# Patient Record
Sex: FEMALE | Race: WHITE | URBAN - METROPOLITAN AREA
[De-identification: names, ages, dates, MRNs, and addresses within clinical notes are randomized per-mention and may not be internally consistent; named-entity substitution may affect disease eponyms.]

---

## 2024-07-15 ENCOUNTER — APPOINTMENT (OUTPATIENT)
Dept: PRIMARY CARE | Facility: CLINIC | Age: 29
End: 2024-07-15

## 2024-07-31 NOTE — PATIENT INSTRUCTIONS
It was nice seeing you today.    Please go to [facility] lab or another [facility] lab facility to complete the lab testing that I ordered .  I have ordered xray of your lower back and hips; you can walk into [facility] radiology to complete.         Please call referral line to schedule to see: 1) sport medicine; 2) physical therapy; and 3) GYN appointment for pap smear and well woman visit       I recommend receiving the TDAP booster that prevents tetanus and other infectious disease.    I recommend the updated COVID vaccine that can be obtained at your local pharmacy    Please schedule a follow up with me in 4  weeks to discuss and review your test results and for PHYSICAL

## 2024-07-31 NOTE — PROGRESS NOTES
"Subjective   Dorota Mathis is a 29 y.o. female who presents for NPV TO ESTABLISH PCP CARE.    HPI:    29 y.o. female who presents for NPV TO ESTABLISH PCP CARE.       [facility] records reviewed. No records available for review.    PMHx:  scoliosis    Healthcare Providers:  none    Preventive Health Services:  -Last physical: 2019  -last pap: 2019  -last STI screening: ?  -Hep C screening: ?    Immunizations:   -Childhood vaccines: completed per patient    -updated COVID spike vaccine: NOW DUE  -TDAP:  2017==> NEXT DUE 2027      There is no immunization history on file for this patient.      Today Dorota reports:    - lower back and bilateral hip pain x 4 days, rated in severity as 5/10, not worsening over time. She reports that she thinks she over stretched doing yoga 4 days ago. She denies numbness/tingling, weakness, saddle anesthesia, or urinary or fecal incontinence or retention, or difficulty walking.     -otherwise doing and feeling well.     -not taking any medications other than OTC ibuprofen    Today she has no other reported complaints, issues, or problems.    ROS is NEG for HEADACHE, NAUSEA, VOMITING, DIARRHEA, CHEST PAIN, SOB, and BLEEDING/.  Review of systems (12) is negative for all systems except for any identified issues in HPI above.    Objective     /78   Pulse 78   Temp 36.7 °C (98 °F)   Ht 1.6 m (5' 3\")   Wt 59.9 kg (132 lb)   LMP 07/10/2024   SpO2 98%   BMI 23.38 kg/m²      Physical Examination:       GENERAL           General Appearance: well-appearing, well-developed, well-hydrated, well-nourished, no acute distress.        HEENT           NECK supple, no masses or thyromegaly, no carotid bruit.        EYES           Extraocular Movements: normal, bilateral eyes TOR, no conjunctival injection.        HEART           Rate and Rhythm regular rate and rhythm. Heart sounds: normal S1S2, no S3 or S4. Murmurs: none.        CHEST           Breath sounds: Clear to IPPA, RR<16 no use of " accessory muscles.        ABDOMEN           General: Neg for LKKS or masses, no scleral icterus or jaundice.        MUSCULOSKELETAL           Joints Demonstration: Neg for erythema, swelling or joint deformities. gross abnormalities no gross abnormalities. SPINE (cervical==> coccyx): non tender to palpation, full ROM. Tenderness to palpation of lumbar paraspinal muscles. Hips: non-tender to palpation, full ROM.       EXTREMITIES           Lower Extremities: Neg for cyanosis, clubbing or edema.       Assessment/Plan   Problem List Items Addressed This Visit    None  Visit Diagnoses       Encounter to establish care    -  Primary    Relevant Orders    CBC and Auto Differential    Comprehensive metabolic panel    Urinalysis with Reflex Microscopic    Tsh With Reflex To Free T4 If Abnormal    Encounter for medication review        Lipid screening        Relevant Orders    Lipid panel    Diabetes mellitus screening        Relevant Orders    Hemoglobin A1c    Vitamin D deficiency        Relevant Orders    Vitamin D 25-Hydroxy,Total (for eval of Vitamin D levels)    Encounter for hepatitis C screening test for low risk patient        Relevant Orders    Hepatitis C antibody    Routine screening for STI (sexually transmitted infection)        Relevant Orders    HIV 1/2 Antigen/Antibody Screen with Reflex to Confirmation    Syphilis Screen with Reflex    C. trachomatis / N. gonorrhoeae, DNA probe    Trichomonas vaginalis, Amplified    Cervical cancer screening        Immunization counseling        Relevant Orders    Referral to Gynecology          Establish PCP care  -labs ordered (see A/P above for details)    Lower back pain, most consistent with lower back strain: no red flag signs/sxs  -ibuprofen 600 mg every  8 hours with food x 5 days  -gentle stretching and apply heating pad every 6-8 hours to lower back muscles  -patient declined flexeril and medrol dose bhavna  -lumbar spine  and bilateral hip XR ordered  -referral to  PT and sports medicine ordered  -Emergency Room precautions discussed and reviewed with patient      Lipid Disorder screening  -lipid panel ordered    Diabetes Screening  -HgBA1c ordered    Vitamin D deficiency  -Vit D levels ordered     Hep C screening  -Hep C antibody ordered     STI Screening:  -HIV, syphilis, GC/CT, trich ordered    Cervical Cancer Screening; last pap smear ?  -referral to GYN for well woman and pap smear     Counseling:       Medication education:         Education:  The patient is counseled regarding potential side-effects of all new medications        Understanding:  Patient expressed understanding        Adherence:  No barriers to adherence identified        Immunizations Counseling  -TDAP now due==> declined  -recommend updated COVID spike vaccine that can be obtained at local pharmacy     FOLLOW-UP: 4 weeks to discuss and review test results and 8 weeks for PHYSICAL     Discussed recommended plan of care with patient. Patient expressed understanding and agreement with plan of care. All of patient's questions were answered at the time. Patient had no additional questions at the time.         Mandy Garcia MD, PhD

## 2024-08-01 ENCOUNTER — OFFICE VISIT (OUTPATIENT)
Dept: PRIMARY CARE | Facility: CLINIC | Age: 29
End: 2024-08-01
Payer: COMMERCIAL

## 2024-08-01 VITALS
OXYGEN SATURATION: 98 % | WEIGHT: 132 LBS | HEIGHT: 63 IN | SYSTOLIC BLOOD PRESSURE: 116 MMHG | TEMPERATURE: 98 F | BODY MASS INDEX: 23.39 KG/M2 | HEART RATE: 78 BPM | DIASTOLIC BLOOD PRESSURE: 78 MMHG

## 2024-08-01 DIAGNOSIS — M54.50 ACUTE BILATERAL LOW BACK PAIN WITHOUT SCIATICA: ICD-10-CM

## 2024-08-01 DIAGNOSIS — Z71.85 IMMUNIZATION COUNSELING: ICD-10-CM

## 2024-08-01 DIAGNOSIS — E55.9 VITAMIN D DEFICIENCY: ICD-10-CM

## 2024-08-01 DIAGNOSIS — T14.8XXA MUSCLE STRAIN: ICD-10-CM

## 2024-08-01 DIAGNOSIS — Z11.59 ENCOUNTER FOR HEPATITIS C SCREENING TEST FOR LOW RISK PATIENT: ICD-10-CM

## 2024-08-01 DIAGNOSIS — M25.551 BILATERAL HIP PAIN: ICD-10-CM

## 2024-08-01 DIAGNOSIS — Z79.899 ENCOUNTER FOR MEDICATION REVIEW: ICD-10-CM

## 2024-08-01 DIAGNOSIS — Z13.220 LIPID SCREENING: ICD-10-CM

## 2024-08-01 DIAGNOSIS — Z76.89 ENCOUNTER TO ESTABLISH CARE: Primary | ICD-10-CM

## 2024-08-01 DIAGNOSIS — Z13.1 DIABETES MELLITUS SCREENING: ICD-10-CM

## 2024-08-01 DIAGNOSIS — Z12.4 CERVICAL CANCER SCREENING: ICD-10-CM

## 2024-08-01 DIAGNOSIS — M25.552 BILATERAL HIP PAIN: ICD-10-CM

## 2024-08-01 DIAGNOSIS — Z11.3 ROUTINE SCREENING FOR STI (SEXUALLY TRANSMITTED INFECTION): ICD-10-CM

## 2024-08-01 PROCEDURE — 3008F BODY MASS INDEX DOCD: CPT | Performed by: FAMILY MEDICINE

## 2024-08-01 PROCEDURE — 1036F TOBACCO NON-USER: CPT | Performed by: FAMILY MEDICINE

## 2024-08-01 PROCEDURE — 99204 OFFICE O/P NEW MOD 45 MIN: CPT | Performed by: FAMILY MEDICINE

## 2024-08-01 ASSESSMENT — PAIN SCALES - GENERAL: PAINLEVEL: 0-NO PAIN

## 2024-08-01 ASSESSMENT — COLUMBIA-SUICIDE SEVERITY RATING SCALE - C-SSRS
6. HAVE YOU EVER DONE ANYTHING, STARTED TO DO ANYTHING, OR PREPARED TO DO ANYTHING TO END YOUR LIFE?: NO
2. HAVE YOU ACTUALLY HAD ANY THOUGHTS OF KILLING YOURSELF?: NO
1. IN THE PAST MONTH, HAVE YOU WISHED YOU WERE DEAD OR WISHED YOU COULD GO TO SLEEP AND NOT WAKE UP?: NO

## 2024-08-01 ASSESSMENT — PATIENT HEALTH QUESTIONNAIRE - PHQ9
2. FEELING DOWN, DEPRESSED OR HOPELESS: NOT AT ALL
1. LITTLE INTEREST OR PLEASURE IN DOING THINGS: NOT AT ALL
SUM OF ALL RESPONSES TO PHQ9 QUESTIONS 1 AND 2: 0

## 2024-09-05 ENCOUNTER — APPOINTMENT (OUTPATIENT)
Dept: PRIMARY CARE | Facility: CLINIC | Age: 29
End: 2024-09-05
Payer: COMMERCIAL

## 2024-09-12 ENCOUNTER — OFFICE VISIT (OUTPATIENT)
Dept: OBSTETRICS AND GYNECOLOGY | Facility: CLINIC | Age: 29
End: 2024-09-12
Payer: COMMERCIAL

## 2024-09-12 VITALS
HEIGHT: 62 IN | BODY MASS INDEX: 24.37 KG/M2 | WEIGHT: 132.4 LBS | DIASTOLIC BLOOD PRESSURE: 81 MMHG | SYSTOLIC BLOOD PRESSURE: 119 MMHG

## 2024-09-12 DIAGNOSIS — Z12.4 SCREENING FOR CERVICAL CANCER: ICD-10-CM

## 2024-09-12 DIAGNOSIS — Z01.419 WELL WOMAN EXAM: Primary | ICD-10-CM

## 2024-09-12 PROCEDURE — 99385 PREV VISIT NEW AGE 18-39: CPT | Performed by: ADVANCED PRACTICE MIDWIFE

## 2024-09-12 PROCEDURE — 3008F BODY MASS INDEX DOCD: CPT | Performed by: ADVANCED PRACTICE MIDWIFE

## 2024-09-12 PROCEDURE — 1036F TOBACCO NON-USER: CPT | Performed by: ADVANCED PRACTICE MIDWIFE

## 2024-09-12 ASSESSMENT — ENCOUNTER SYMPTOMS
LOSS OF SENSATION IN FEET: 0
OCCASIONAL FEELINGS OF UNSTEADINESS: 0
DEPRESSION: 0

## 2024-09-12 ASSESSMENT — PATIENT HEALTH QUESTIONNAIRE - PHQ9
SUM OF ALL RESPONSES TO PHQ9 QUESTIONS 1 AND 2: 0
1. LITTLE INTEREST OR PLEASURE IN DOING THINGS: NOT AT ALL
2. FEELING DOWN, DEPRESSED OR HOPELESS: NOT AT ALL

## 2024-09-12 ASSESSMENT — PAIN SCALES - GENERAL: PAINLEVEL: 0-NO PAIN

## 2024-09-12 NOTE — PROGRESS NOTES
"Assessment/Plan   Problem List Items Addressed This Visit       Well woman exam - Primary    Overview     History  Age of menarche: 11  Last pap: 2019 Normal pap sent 2024  History of abnormal: x 1 repeat normal  Mammogram: No  History of abnormal: No  History of STI: No  Contraception: No    Sexual Health  Active with: Male  Pain: No  Lubrication: No  Orgasm: No  Experienced sexual choking in past year: No    Family Cancer History  Breast: No  Ovarian: No  Endometrial: No  Colon: No    Colon cancer screening:   N/A - age  Calcium CT Scoring: N/A - age    Safety  Feels safe in home: Yes  Has been forced in sexual activity in last year: No                  Lara Iraheta, ALLEY-STEPHEN     Ruba Mathis is a 29 y.o. female who is here for a routine exam. Periods are regular every 35 days, lasting 5 days. Dysmenorrhea:none to mild cramping. Cyclic symptoms include none except for breast tenderness. No intermenstrual bleeding, spotting, or discharge.    ROS      /81   Ht 1.575 m (5' 2\")   Wt 60.1 kg (132 lb 6.4 oz)   LMP 08/23/2024 (Exact Date)   BMI 24.22 kg/m²     General:   Alert and oriented x 3   Heart:  Thyroid: Regular rate, rhythm  Euthyroid, normal shape and size   Lungs:  Breast: Clear to auscultation bilaterally  Symmetrical, no skin changes/nipple discharge, redness, tenderness, no masses palpated bilaterally   Abdomen: Soft, non tender   Vulva: EGBUS normal   Vagina: Pink, normal discharge   Cervix: No CMT.  Small discolored/freckle area at 11 o'clock on cervix   Uterus: Normal shape, size   Adnexa: NT bilaterally       Thank you for coming to see me for your visit today and most importantly thank you for having a preventative visit.  If lab work was sent, you will see your test result via [facility]  Any prescriptions will be sent electronically to your pharmacy listed    I look forward to seeing you next year for your health care needs.  Please remember:   Sleep is important!   Exercise " such as walking for 20 minutes per day is beneficial to your physical and mental health   Sleep is so important and should be a priority!   Healthy diets can be expensive -- if you every feel like you are struggling to afford healthy food, please let me know as we have a very good program called [employer] that is available to you   Decrease alcohol and tobacco    Be well!  Melanie

## 2024-09-19 ENCOUNTER — APPOINTMENT (OUTPATIENT)
Dept: PRIMARY CARE | Facility: CLINIC | Age: 29
End: 2024-09-19
Payer: COMMERCIAL

## 2024-09-24 LAB
CYTOLOGY CMNT CVX/VAG CYTO-IMP: NORMAL
LAB AP HPV GENOTYPE QUESTION: NO
LAB AP HPV HR: NORMAL
LABORATORY COMMENT REPORT: NORMAL
LMP START DATE: NORMAL
PATH REPORT.TOTAL CANCER: NORMAL

## 2024-10-04 ENCOUNTER — LAB (OUTPATIENT)
Dept: LAB | Facility: LAB | Age: 29
End: 2024-10-04
Payer: COMMERCIAL

## 2024-10-04 DIAGNOSIS — Z13.1 DIABETES MELLITUS SCREENING: ICD-10-CM

## 2024-10-04 DIAGNOSIS — Z76.89 ENCOUNTER TO ESTABLISH CARE: ICD-10-CM

## 2024-10-04 DIAGNOSIS — E55.9 VITAMIN D DEFICIENCY: ICD-10-CM

## 2024-10-04 DIAGNOSIS — Z13.220 LIPID SCREENING: ICD-10-CM

## 2024-10-04 DIAGNOSIS — Z11.3 ROUTINE SCREENING FOR STI (SEXUALLY TRANSMITTED INFECTION): ICD-10-CM

## 2024-10-04 DIAGNOSIS — D72.819 LEUKOPENIA, UNSPECIFIED TYPE: Primary | ICD-10-CM

## 2024-10-04 DIAGNOSIS — Z11.59 ENCOUNTER FOR HEPATITIS C SCREENING TEST FOR LOW RISK PATIENT: ICD-10-CM

## 2024-10-04 LAB
25(OH)D3 SERPL-MCNC: 46 NG/ML (ref 30–100)
ALBUMIN SERPL BCP-MCNC: 4.3 G/DL (ref 3.4–5)
ALP SERPL-CCNC: 47 U/L (ref 33–110)
ALT SERPL W P-5'-P-CCNC: 15 U/L (ref 7–45)
ANION GAP SERPL CALC-SCNC: 11 MMOL/L (ref 10–20)
APPEARANCE UR: CLEAR
AST SERPL W P-5'-P-CCNC: 18 U/L (ref 9–39)
BASOPHILS # BLD AUTO: 0.05 X10*3/UL (ref 0–0.1)
BASOPHILS NFR BLD AUTO: 1.3 %
BILIRUB SERPL-MCNC: 1.1 MG/DL (ref 0–1.2)
BILIRUB UR STRIP.AUTO-MCNC: NEGATIVE MG/DL
BUN SERPL-MCNC: 17 MG/DL (ref 6–23)
CALCIUM SERPL-MCNC: 9.5 MG/DL (ref 8.6–10.6)
CHLORIDE SERPL-SCNC: 102 MMOL/L (ref 98–107)
CHOLEST SERPL-MCNC: 156 MG/DL (ref 0–199)
CHOLESTEROL/HDL RATIO: 2.4
CO2 SERPL-SCNC: 31 MMOL/L (ref 21–32)
COLOR UR: NORMAL
CREAT SERPL-MCNC: 0.69 MG/DL (ref 0.5–1.05)
EGFRCR SERPLBLD CKD-EPI 2021: >90 ML/MIN/1.73M*2
EOSINOPHIL # BLD AUTO: 0.16 X10*3/UL (ref 0–0.7)
EOSINOPHIL NFR BLD AUTO: 4 %
ERYTHROCYTE [DISTWIDTH] IN BLOOD BY AUTOMATED COUNT: 12.1 % (ref 11.5–14.5)
EST. AVERAGE GLUCOSE BLD GHB EST-MCNC: 91 MG/DL
GLUCOSE SERPL-MCNC: 88 MG/DL (ref 74–99)
GLUCOSE UR STRIP.AUTO-MCNC: NORMAL MG/DL
HBA1C MFR BLD: 4.8 %
HCT VFR BLD AUTO: 41.3 % (ref 36–46)
HCV AB SER QL: NONREACTIVE
HDLC SERPL-MCNC: 64.7 MG/DL
HGB BLD-MCNC: 13.6 G/DL (ref 12–16)
HIV 1+2 AB+HIV1 P24 AG SERPL QL IA: NONREACTIVE
IMM GRANULOCYTES # BLD AUTO: 0.01 X10*3/UL (ref 0–0.7)
IMM GRANULOCYTES NFR BLD AUTO: 0.3 % (ref 0–0.9)
KETONES UR STRIP.AUTO-MCNC: NEGATIVE MG/DL
LDLC SERPL CALC-MCNC: 80 MG/DL
LEUKOCYTE ESTERASE UR QL STRIP.AUTO: NEGATIVE
LYMPHOCYTES # BLD AUTO: 1.73 X10*3/UL (ref 1.2–4.8)
LYMPHOCYTES NFR BLD AUTO: 43.7 %
MCH RBC QN AUTO: 29.8 PG (ref 26–34)
MCHC RBC AUTO-ENTMCNC: 32.9 G/DL (ref 32–36)
MCV RBC AUTO: 91 FL (ref 80–100)
MONOCYTES # BLD AUTO: 0.33 X10*3/UL (ref 0.1–1)
MONOCYTES NFR BLD AUTO: 8.3 %
NEUTROPHILS # BLD AUTO: 1.68 X10*3/UL (ref 1.2–7.7)
NEUTROPHILS NFR BLD AUTO: 42.4 %
NITRITE UR QL STRIP.AUTO: NEGATIVE
NON HDL CHOLESTEROL: 91 MG/DL (ref 0–149)
NRBC BLD-RTO: 0 /100 WBCS (ref 0–0)
PH UR STRIP.AUTO: 6.5 [PH]
PLATELET # BLD AUTO: 306 X10*3/UL (ref 150–450)
POTASSIUM SERPL-SCNC: 4.5 MMOL/L (ref 3.5–5.3)
PROT SERPL-MCNC: 6.7 G/DL (ref 6.4–8.2)
PROT UR STRIP.AUTO-MCNC: NEGATIVE MG/DL
RBC # BLD AUTO: 4.56 X10*6/UL (ref 4–5.2)
RBC # UR STRIP.AUTO: NEGATIVE /UL
SODIUM SERPL-SCNC: 139 MMOL/L (ref 136–145)
SP GR UR STRIP.AUTO: 1.01
TREPONEMA PALLIDUM IGG+IGM AB [PRESENCE] IN SERUM OR PLASMA BY IMMUNOASSAY: NONREACTIVE
TRIGL SERPL-MCNC: 57 MG/DL (ref 0–149)
TSH SERPL-ACNC: 2.14 MIU/L (ref 0.44–3.98)
UROBILINOGEN UR STRIP.AUTO-MCNC: NORMAL MG/DL
VLDL: 11 MG/DL (ref 0–40)
WBC # BLD AUTO: 4 X10*3/UL (ref 4.4–11.3)

## 2024-10-04 PROCEDURE — 86803 HEPATITIS C AB TEST: CPT

## 2024-10-04 PROCEDURE — 86780 TREPONEMA PALLIDUM: CPT

## 2024-10-04 PROCEDURE — 87389 HIV-1 AG W/HIV-1&-2 AB AG IA: CPT

## 2024-10-04 PROCEDURE — 87491 CHLMYD TRACH DNA AMP PROBE: CPT

## 2024-10-04 PROCEDURE — 83036 HEMOGLOBIN GLYCOSYLATED A1C: CPT

## 2024-10-04 PROCEDURE — 85025 COMPLETE CBC W/AUTO DIFF WBC: CPT

## 2024-10-04 PROCEDURE — 81003 URINALYSIS AUTO W/O SCOPE: CPT

## 2024-10-04 PROCEDURE — 80053 COMPREHEN METABOLIC PANEL: CPT

## 2024-10-04 PROCEDURE — 87661 TRICHOMONAS VAGINALIS AMPLIF: CPT

## 2024-10-04 PROCEDURE — 80061 LIPID PANEL: CPT

## 2024-10-04 PROCEDURE — 84443 ASSAY THYROID STIM HORMONE: CPT

## 2024-10-04 PROCEDURE — 82306 VITAMIN D 25 HYDROXY: CPT

## 2024-10-04 PROCEDURE — 36415 COLL VENOUS BLD VENIPUNCTURE: CPT

## 2024-10-04 PROCEDURE — 87591 N.GONORRHOEAE DNA AMP PROB: CPT

## 2024-10-04 ASSESSMENT — PROMIS GLOBAL HEALTH SCALE
RATE_MENTAL_HEALTH: EXCELLENT
RATE_SOCIAL_SATISFACTION: EXCELLENT
RATE_AVERAGE_PAIN: 0
CARRYOUT_SOCIAL_ACTIVITIES: EXCELLENT
EMOTIONAL_PROBLEMS: RARELY
CARRYOUT_PHYSICAL_ACTIVITIES: COMPLETELY
RATE_PHYSICAL_HEALTH: EXCELLENT
RATE_QUALITY_OF_LIFE: EXCELLENT
RATE_GENERAL_HEALTH: EXCELLENT

## 2024-10-05 LAB
C TRACH RRNA SPEC QL NAA+PROBE: NEGATIVE
N GONORRHOEA DNA SPEC QL PROBE+SIG AMP: NEGATIVE
T VAGINALIS RRNA SPEC QL NAA+PROBE: NEGATIVE

## 2024-10-07 NOTE — PATIENT INSTRUCTIONS
It was nice seeing you today.     Please go to [facility] lab or another [facility] lab facility to complete the repeat CBC lab testing that I ordered .     Please call radiology to schedule diagnostic left mammogram and left breast US.                                           Please call referral line to schedule to see: 1) hematology for evaluation low white blood cell counts.                 I recommend receiving the Flu and TDAP booster that prevents tetanus and other infectious disease.     I recommend the updated COVID vaccine that can be obtained at your local pharmacy     Please schedule a follow up with me in 12  weeks

## 2024-10-07 NOTE — PROGRESS NOTES
Subjective   Dorota Mathis is a 29 y.o. female who presents for FOLLOW UP TO DISCUSS and REVIEW TEST RESULTS and FOR ANNUAL PHYSICAL EXAM.    HPI:      29 y.o. female who presents for FOLLOW UP TO DISCUSS and REVIEW TEST RESULTS and FOR ANNUAL PHYSICAL EXAM.     [facility] records reviewed.      Last seen by me on 8/1/24 for NPV TO ESTABLISH PCP CARE.  At visit:    Establish PCP care  -labs ordered (see A/P above for details)     Lower back pain, most consistent with lower back strain: no red flag signs/sxs  -ibuprofen 600 mg every  8 hours with food x 5 days  -gentle stretching and apply heating pad every 6-8 hours to lower back muscles  -patient declined flexeril and medrol dose bhavna  -lumbar spine  and bilateral hip XR ordered  -referral to PT and sports medicine ordered  -Emergency Room precautions discussed and reviewed with patient        Lipid Disorder screening  -lipid panel ordered     Diabetes Screening  -HgBA1c ordered     Vitamin D deficiency  -Vit D levels ordered     Hep C screening  -Hep C antibody ordered     STI Screening:  -HIV, syphilis, GC/CT, trich ordered     Cervical Cancer Screening; last pap smear ?  -referral to GYN for well woman and pap smear     Immunizations Counseling  -TDAP now due==> declined  -recommend updated COVID spike vaccine that can be obtained at local pharmacy     FOLLOW-UP: 4 weeks to discuss and review test results and 8 weeks for PHYSICAL             PMHx:  scoliosis     Healthcare Providers:  none     Preventive Health Services:  -Last physical: 2019  -last pap: 9/12/24 Negative for intraepithelial lesion or malignancy.   -last STI screening: HIV, syphilis, GC/CT/trich negative 10/4/24  -Hep C screening:  negative 10/4/24     Immunizations:   -Childhood vaccines: completed per patient    -updated COVID spike vaccine: NOW DUE  -TDAP:  2017==> NEXT DUE 2027   -Flu: NOW DUE==> declined     There is no immunization history on file for this patient.    INTERVAL  "HISTORY:    -saw GYN 9/12/24 for well woman visit and pap smear    -lumbar spine and hip XR ordered 9/1/24 NOT YET COMPLETED    -completed labs ordered 8/1/24. Based on test results:    -low white blood cell count, otherwise normal blood counts: patient advised I have ordered a referral to hematology for further evaluation of low white blood count     -negative syphilis, Hep C, and HIV     -normal kidney and liver function     -good cholesterol control     -normal urinalysis     -thyroid function normal     -Vit D normal     -HgBA1c normal: no prediabetes    -gonorrhea, trichomonas, and chlamydia negative           Today Dorota reports:     - RESOLVED lower back and bilateral hip pain      -doing and feeling well.      -not taking any medications other than OTC ibuprofen    -has NOT YET scheduled appt with hematology for evaluation of leukopenia; states she will and schedule appt     Today she has no other reported complaints, issues, or problems.     ROS is NEG for HEADACHE, NAUSEA, VOMITING, DIARRHEA, CHEST PAIN, SOB, and BLEEDING/.  Review of systems (12) is negative for all systems except for any identified issues in HPI above.       Objective     /78   Pulse 66   Temp 36.5 °C (97.7 °F)   Ht 1.588 m (5' 2.5\")   Wt 60.2 kg (132 lb 12.8 oz)   LMP 09/29/2024 (Exact Date)   SpO2 99%   BMI 23.90 kg/m²      Physical Examination:       GENERAL           General Appearance: pleasant, well-appearing, well-developed, well-hydrated, well-nourished, well-groomed.        HEENT           NECK supple, Neg for adneopathy no thyroid enlargement or nodules, Oropharynx normal no exudates. Ears: TMs intact, normal no effusions.       EYES           Pupils: PERRLA, no photophobia.        HEART           Rate and Rhythm regular rate and rhythm. Heart sounds: normal S1S2. Murmurs: none.        LUNGS           Effort: Normal chest wall, no pectus, Normal air entry all fields, Clear to IPPA, RR<16 with no use of accessory " muscles.         BREASTS (patient declined chaperone)           Bilaterally: LEFT BREAST: palpable apricot pit sized mass in upper outer quadrant, non-tender to palpation, no nipple retraction, no nipple discharge, no abnormal skin changes. RIGHT BREAST: no masses, no nipple retraction, no nipple discharge, no abnormal skin changes. Axilla: no lymphadenopathy.        BACK           General: unremarkable, no spinal tenderness or rashes.        ABDOMEN           General: Normal to inspection, neg for LKKS or masses and BSs heard in all quadrants                  LYMPHATICS           Cervical: none. Axillary: none.        MUSCULOSKELETAL           gross abnormalities no gross abnormalities, no joint redness or swelling. SPINE (cervical==> coccyx): non tender to palpation, full ROM. Tenderness to palpation of lumbar paraspinal muscles. Hips: non-tender to palpation, full ROM.        EXTREMITIES           Varicose veins: not present. Pulses: 2+ bilateral. Clubbing: none. Cyanosis: no.        NEUROLOGICAL           Orientation: alert and oriented x 3. Grossly normal: yes. Plantars: downgoing bilaterally. Muscle Bulk: normal . Cranial Nerves: CN's II-XII grossly intact.        PSYCHOLOGY           Affect: appropriate. Mood: pleasant.       SKIN: no rashes. No lesions.          Assessment/Plan   Problem List Items Addressed This Visit    None  Visit Diagnoses       Annual physical exam    -  Primary    Leukopenia, unspecified type        Relevant Orders    CBC and Auto Differential    Acute bilateral low back pain without sciatica        Muscle strain        Immunization counseling              Annual Physical Exam==> COMPLETED Today 10/10/24==> NEXT DUE 10/10/25    Left breast mass: Denies Fhx f breast cancer. r/o neoplasm  -Left diagnostic mammogram and left breast US ordered    Leukopenia:   -repeat CBC ordered  -referral to hematology previously ordered for further evaluation; patient advised to call and schedule  appt    History Lower back pain, most consistent with lower back strain==> RESOLVED        Counseling:       Medication education:         Education:  The patient is counseled regarding potential side-effects of all new medications        Understanding:  Patient expressed understanding        Adherence:  No barriers to adherence identified        Immunizations Counseling  -TDAP and flu now due==> declined  -recommend updated COVID spike vaccine that can be obtained at local pharmacy     FOLLOW-UP: 12 weeks     I have personally reviewed all available pertinent labs, imaging, and consult notes with the patient.      Discussed recommended plan of care with patient. Patient expressed understanding and agreement with plan of care. All of patient's questions were answered at the time. Patient had no additional questions at the time.         Mandy Garcia M.D.

## 2024-10-10 ENCOUNTER — OFFICE VISIT (OUTPATIENT)
Dept: PRIMARY CARE | Facility: CLINIC | Age: 29
End: 2024-10-10
Payer: COMMERCIAL

## 2024-10-10 VITALS
OXYGEN SATURATION: 99 % | WEIGHT: 132.8 LBS | TEMPERATURE: 97.7 F | BODY MASS INDEX: 23.53 KG/M2 | HEART RATE: 66 BPM | HEIGHT: 63 IN | SYSTOLIC BLOOD PRESSURE: 110 MMHG | DIASTOLIC BLOOD PRESSURE: 78 MMHG

## 2024-10-10 DIAGNOSIS — M54.50 ACUTE BILATERAL LOW BACK PAIN WITHOUT SCIATICA: ICD-10-CM

## 2024-10-10 DIAGNOSIS — D72.819 LEUKOPENIA, UNSPECIFIED TYPE: ICD-10-CM

## 2024-10-10 DIAGNOSIS — T14.8XXA MUSCLE STRAIN: ICD-10-CM

## 2024-10-10 DIAGNOSIS — N63.21 MASS OF UPPER OUTER QUADRANT OF LEFT BREAST: ICD-10-CM

## 2024-10-10 DIAGNOSIS — Z71.85 IMMUNIZATION COUNSELING: ICD-10-CM

## 2024-10-10 DIAGNOSIS — Z00.00 ANNUAL PHYSICAL EXAM: Primary | ICD-10-CM

## 2024-10-10 PROCEDURE — 1036F TOBACCO NON-USER: CPT | Performed by: FAMILY MEDICINE

## 2024-10-10 PROCEDURE — 3008F BODY MASS INDEX DOCD: CPT | Performed by: FAMILY MEDICINE

## 2024-10-10 PROCEDURE — 99395 PREV VISIT EST AGE 18-39: CPT | Performed by: FAMILY MEDICINE

## 2024-10-10 ASSESSMENT — COLUMBIA-SUICIDE SEVERITY RATING SCALE - C-SSRS
2. HAVE YOU ACTUALLY HAD ANY THOUGHTS OF KILLING YOURSELF?: NO
6. HAVE YOU EVER DONE ANYTHING, STARTED TO DO ANYTHING, OR PREPARED TO DO ANYTHING TO END YOUR LIFE?: NO
1. IN THE PAST MONTH, HAVE YOU WISHED YOU WERE DEAD OR WISHED YOU COULD GO TO SLEEP AND NOT WAKE UP?: NO

## 2024-10-10 ASSESSMENT — PATIENT HEALTH QUESTIONNAIRE - PHQ9
2. FEELING DOWN, DEPRESSED OR HOPELESS: NOT AT ALL
SUM OF ALL RESPONSES TO PHQ9 QUESTIONS 1 AND 2: 0
1. LITTLE INTEREST OR PLEASURE IN DOING THINGS: NOT AT ALL

## 2024-10-10 ASSESSMENT — PAIN SCALES - GENERAL: PAINLEVEL: 0-NO PAIN

## 2024-10-31 ENCOUNTER — APPOINTMENT (OUTPATIENT)
Dept: RADIOLOGY | Facility: HOSPITAL | Age: 29
End: 2024-10-31
Payer: COMMERCIAL

## 2024-10-31 ENCOUNTER — HOSPITAL ENCOUNTER (OUTPATIENT)
Dept: RADIOLOGY | Facility: HOSPITAL | Age: 29
Discharge: HOME | End: 2024-10-31
Payer: COMMERCIAL

## 2024-10-31 DIAGNOSIS — N63.21 MASS OF UPPER OUTER QUADRANT OF LEFT BREAST: ICD-10-CM

## 2024-10-31 PROCEDURE — 76642 ULTRASOUND BREAST LIMITED: CPT | Mod: LEFT SIDE | Performed by: RADIOLOGY

## 2024-10-31 PROCEDURE — 76642 ULTRASOUND BREAST LIMITED: CPT | Mod: LT

## 2024-12-18 ENCOUNTER — APPOINTMENT (OUTPATIENT)
Dept: HEMATOLOGY/ONCOLOGY | Facility: CLINIC | Age: 29
End: 2024-12-18
Payer: COMMERCIAL

## 2025-01-04 NOTE — PROGRESS NOTES
Subjective   Dorota Mathis is a 29 y.o. female who presents for FOLLOW UP VISIT to DISCUSS and REVIEW TEST RESULTS.    HPI:      29 y.o. female who presents for FOLLOW UP VISIT to DISCUSS and REVIEW TEST RESULTS..     [facility] records reviewed.      Last seen by me on 10/10/24 for FOLLOW UP TO DISCUSS and REVIEW TEST RESULTS and FOR ANNUAL PHYSICAL EXAM.  At visit:    Annual Physical Exam==> COMPLETED Today 10/10/24==> NEXT DUE 10/10/25     Left breast mass: Denies Fhx of breast cancer. r/o neoplasm  -Left diagnostic mammogram and left breast US ordered     Leukopenia:   -repeat CBC ordered  -referral to hematology previously ordered for further evaluation; patient advised to call and schedule appt     History Lower back pain, most consistent with lower back strain==> RESOLVED         Immunizations Counseling  -TDAP and flu now due==> declined  -recommend updated COVID spike vaccine that can be obtained at local pharmacy     FOLLOW-UP: 12 weeks              Also seen by me on 8/1/24 for NPV TO ESTABLISH PCP CARE.  At visit:     Establish PCP care  -labs ordered (see A/P above for details)     Lower back pain, most consistent with lower back strain: no red flag signs/sxs  -ibuprofen 600 mg every  8 hours with food x 5 days  -gentle stretching and apply heating pad every 6-8 hours to lower back muscles  -patient declined flexeril and medrol dose bhavna  -lumbar spine  and bilateral hip XR ordered  -referral to PT and sports medicine ordered  -Emergency Room precautions discussed and reviewed with patient        Lipid Disorder screening  -lipid panel ordered     Diabetes Screening  -HgBA1c ordered     Vitamin D deficiency  -Vit D levels ordered     Hep C screening  -Hep C antibody ordered     STI Screening:  -HIV, syphilis, GC/CT, trich ordered     Cervical Cancer Screening; last pap smear ?  -referral to GYN for well woman and pap smear     Immunizations Counseling  -TDAP now due==> declined  -recommend updated COVID  spike vaccine that can be obtained at local pharmacy     FOLLOW-UP: 4 weeks to discuss and review test results and 8 weeks for PHYSICAL              PMHx:  scoliosis     Healthcare Providers:  none     Preventive Health Services:  -Last physical: 10/10/24  -last pap: 9/12/24 Negative for intraepithelial lesion or malignancy.   -last STI screening: HIV, syphilis, GC/CT/trich negative 10/4/24  -Hep C screening:  negative 10/4/24     Immunizations:   -Childhood vaccines: completed per patient    -updated COVID spike vaccine: NOW DUE  -TDAP:  2017==> NEXT DUE 2027   -Flu: NOW DUE==> declined       There is no immunization history on file for this patient.       INTERVAL HISTORY:        -repeat CBC  ordered 10/10/24 NOT YET COMPLETED    -lumbar spine and hip XR ordered 9/1/24 NOT YET COMPLETED==> RESOLVED lumbar and hip pain     Left breast US (10/31/24):  Interpreted By: Delia Genao,   IMPRESSION:  Dense fibroglandular tissue at the site of clinical concern with no  targeted sonographic evidence of malignancy or solid mass or cyst at  this site.      BI-RADS CATEGORY:  BI-RADS Category:  1 Negative.  Recommendation:  Follow-up as recommended or needed.      Today Dorota reports:     -doing and feeling well.     -RESOLVED lower back and bilateral hip pain      -not taking any medications other than OTC ibuprofen as needed     -has NOT YET scheduled appt with hematology for evaluation of leukopenia; states she will and schedule appt since she cancelled previously scheduled appt     Today she has no other reported complaints, issues, or problems.     ROS is NEG for HEADACHE, NAUSEA, VOMITING, DIARRHEA, CHEST PAIN, SOB, and BLEEDING.  Review of systems (12) is negative for all systems except for any identified issues in HPI above.         Objective     /84   Pulse 85   Temp 36.6 °C (97.8 °F)   LMP 12/11/2024   SpO2 97%      Physical Examination:       GENERAL           General Appearance: well-appearing,  "well-developed, well-hydrated, well-nourished, no acute distress.        HEENT           NECK supple, no masses or thyromegaly, no carotid bruit.        EYES           Extraocular Movements: normal, bilateral eyes TOR, no conjunctival injection.        HEART           Rate and Rhythm regular rate and rhythm. Heart sounds: normal S1S2, no S3 or S4. Murmurs: none.        CHEST           Breath sounds: Clear to IPPA, RR<16 no use of accessory muscles.        ABDOMEN           General: Neg for LKKS or masses, no scleral icterus or jaundice.        MUSCULOSKELETAL           Joints Demonstration: Neg for erythema, swelling or joint deformities. gross abnormalities no gross abnormalities.        EXTREMITIES           Lower Extremities: Neg for cyanosis, clubbing or edema.       Assessment/Plan   Problem List Items Addressed This Visit    None  Visit Diagnoses       Leukopenia, unspecified type    -  Primary    Mass of upper outer quadrant of left breast        Immunization counseling        Acute bilateral low back pain without sciatica                Leukopenia:   -repeat CBC ordered 10/10/24  -referral to hematology previously ordered for further evaluation; patient advised to call and schedule appt     History Lower back pain, most consistent with lower back strain==> RESOLVED     History of palpable  left breast mass/density: patient denies Fhx breast cancer.  Completed left breast US 10/31/24 that per radiology showed \"Dense fibroglandular tissue at the site of clinical concern with no targeted sonographic evidence of malignancy or solid mass or cyst at this site.  -will continue to monitor     Counseling:       Medication education:         Education:  The patient is counseled regarding potential side-effects of all new medications        Understanding:  Patient expressed understanding        Adherence:  No barriers to adherence identified        Immunizations Counseling  -flu now due==> declined  -recommend " updated COVID spike vaccine that can be obtained at local pharmacy     FOLLOW-UP: 12 weeks      I have personally reviewed all available pertinent labs, imaging, and consult notes with the patient.      Discussed recommended plan of care with patient. Patient expressed understanding and agreement with plan of care. All of patient's questions were answered at the time. Patient had no additional questions at the time.         Mandy Garcia MD, PhD

## 2025-01-07 ENCOUNTER — OFFICE VISIT (OUTPATIENT)
Dept: PRIMARY CARE | Facility: CLINIC | Age: 30
End: 2025-01-07
Payer: COMMERCIAL

## 2025-01-07 VITALS
TEMPERATURE: 97.8 F | HEART RATE: 85 BPM | DIASTOLIC BLOOD PRESSURE: 84 MMHG | OXYGEN SATURATION: 97 % | SYSTOLIC BLOOD PRESSURE: 114 MMHG

## 2025-01-07 DIAGNOSIS — Z71.85 IMMUNIZATION COUNSELING: ICD-10-CM

## 2025-01-07 DIAGNOSIS — N63.21 MASS OF UPPER OUTER QUADRANT OF LEFT BREAST: ICD-10-CM

## 2025-01-07 DIAGNOSIS — D72.819 LEUKOPENIA, UNSPECIFIED TYPE: Primary | ICD-10-CM

## 2025-01-07 DIAGNOSIS — M54.50 ACUTE BILATERAL LOW BACK PAIN WITHOUT SCIATICA: ICD-10-CM

## 2025-01-07 PROBLEM — D72.810 LYMPHOPENIA: Status: ACTIVE | Noted: 2025-01-07

## 2025-01-07 PROCEDURE — 99214 OFFICE O/P EST MOD 30 MIN: CPT | Performed by: FAMILY MEDICINE

## 2025-01-07 PROCEDURE — 1036F TOBACCO NON-USER: CPT | Performed by: FAMILY MEDICINE

## 2025-01-07 ASSESSMENT — PAIN SCALES - GENERAL: PAINLEVEL_OUTOF10: 0-NO PAIN

## 2025-01-08 ENCOUNTER — TELEPHONE (OUTPATIENT)
Dept: PRIMARY CARE | Facility: CLINIC | Age: 30
End: 2025-01-08
Payer: COMMERCIAL

## 2025-01-08 NOTE — TELEPHONE ENCOUNTER
Source   Dorota Mathis (Patient)    Subject       Topic   Information Request - Doctor, Hospital, or Provider      Communication   HI, Patient has an active referral from Dr. Mandy Garcia MD for Hem Onc. We've made several attempts to contact patient to schedule with no response. How would you like to move forward with the referral? Thanks

## 2025-01-08 NOTE — TELEPHONE ENCOUNTER
Repeat CBC was ordered.  If CBC remains abnormal with leukopenia, she will call and schedule with hematology.  Thank you